# Patient Record
(demographics unavailable — no encounter records)

---

## 2024-12-02 NOTE — CARDIOLOGY SUMMARY
[de-identified] : 12/2/24: NSR at 83 bpm 6/24/24: NSR at 62 bpm 6/14/24: Sinus arrhythmia at 81 bpm

## 2024-12-02 NOTE — ASSESSMENT
[FreeTextEntry1] : 27-year-old man, with a past medical history of hyperlipidemia, asthma and bipolar disorder, presenting for follow up.  HLD: Latest lipid profile on June 14, 2024 with a total cholesterol 191, high density lipoprotein 30, low-density lipoprotein 125, and triglycerides 201 (non-fasting). - deferring medical therapy at this time in favor of lifestyle interventions (diet and exercise) - weight loss counseling - increase aerobic exercise as tolerated to aim for approximately 30 minutes of activity 5 days a week - heart healthy diet low in sodium, sugars and saturated fats and high in fruits, vegetables and whole grains - will reassess lipid paneltoday, and readdress potential medical therapy if there is no meaningful change at this time  Overweight: BMI 29.86 kg/m2. - lifestyle modifications (diet and exercise) - weight loss counseling - increase aerobic exercise as tolerated to aim for approximately 30 minutes of activity 5 days a week - heart healthy diet low in sodium, sugars and saturated fats and high in fruits, vegetables and whole grains  F/u in 1 year or earlier PRN.

## 2024-12-02 NOTE — HISTORY OF PRESENT ILLNESS
[FreeTextEntry1] : 27-year-old man, with a past medical history of hyperlipidemia, asthma and bipolar disorder, presenting for follow up. Patient presently denies chest pain, shortness of breath/dyspnea on exertion, palpitations, dizziness/loss of consciousness, paroxysmal nocturnal dyspnea, orthopnea, headaches, abdominal pain, and lower extremity swelling. He is able to ambulate >10 blocks and >2 flights of stairs without inhibition by chest pain or dyspnea on exertion; and has been exercising at the gym regularly. Today, he notes concern over his extensive family history of early-onset cardiovascular disease.